# Patient Record
Sex: FEMALE | HISPANIC OR LATINO | Employment: FULL TIME | URBAN - METROPOLITAN AREA
[De-identification: names, ages, dates, MRNs, and addresses within clinical notes are randomized per-mention and may not be internally consistent; named-entity substitution may affect disease eponyms.]

---

## 2017-07-12 ENCOUNTER — OFFICE VISIT (OUTPATIENT)
Dept: RETAIL CLINIC | Facility: CLINIC | Age: 32
End: 2017-07-12

## 2017-07-12 VITALS
RESPIRATION RATE: 18 BRPM | HEART RATE: 74 BPM | SYSTOLIC BLOOD PRESSURE: 96 MMHG | OXYGEN SATURATION: 98 % | TEMPERATURE: 96.8 F | DIASTOLIC BLOOD PRESSURE: 60 MMHG

## 2017-07-12 DIAGNOSIS — H66.002 ACUTE SUPPURATIVE OTITIS MEDIA OF LEFT EAR WITHOUT SPONTANEOUS RUPTURE OF TYMPANIC MEMBRANE, RECURRENCE NOT SPECIFIED: Primary | ICD-10-CM

## 2017-07-12 PROCEDURE — 99203 OFFICE O/P NEW LOW 30 MIN: CPT | Performed by: NURSE PRACTITIONER

## 2017-07-12 RX ORDER — AMOXICILLIN 875 MG/1
875 TABLET, COATED ORAL 2 TIMES DAILY
Qty: 20 TABLET | Refills: 0 | Status: SHIPPED | OUTPATIENT
Start: 2017-07-12 | End: 2017-07-22

## 2017-07-12 NOTE — PATIENT INSTRUCTIONS
Otitis media - Adultos  (Otitis Media, Adult)  La otitis media es el enrojecimiento, el dolor y la inflamación del oído medio. La causa de la otitis media puede ser olivia alergia o, más frecuentemente, olivia infección. Muchas veces ocurre carter olivia complicación de un resfrío común.  SIGNOS Y SÍNTOMAS  Los síntomas de la otitis media son:  · Dolor de oídos.  · Fiebre.  · Zumbidos en el oído.  · Dolor de javon.  · Pérdida de líquido por el oído.  DIAGNÓSTICO  Para diagnosticar la otitis media, el médico examinará aparicio oído con un otoscopio. Brinda instrumento le permite al médico observar el interior del oído y examinar el tímpano. El médico le preguntará acerca de sheila síntomas.  TRATAMIENTO   Generalmente la otitis media mejora sin tratamiento entre 3 y los 5 días. El médico podrá recetar algunos medicamentos para calmar el dolor. Si la otitis media no mejora dentro de los 5 días o es recurrente, el médico puede prescribir antibióticos si sospecha que la causa es olivia infección bacteriana.  INSTRUCCIONES PARA EL CUIDADO EN EL HOGAR    · Si le recetaron antibióticos, asegúrese de terminarlos, incluso si comienza a sentirse mejor.  · [place] los medicamentos solamente carter se lo haya indicado el médico.  · Concurra a todas las visitas de control carter se lo haya indicado el médico.  SOLICITE ATENCIÓN MÉDICA SI:  · Tiene otitis media solo en un oído o sangra por la nariz, o ambas cosas.  · Advierte un bulto en el mónica.  · No mejora luego de 3 a 5 días.  · Empeora en lugar de mejorar.  SOLICITE ATENCIÓN MÉDICA DE INMEDIATO SI:   · Aumenta el dolor y no puede controlarlo con la medicación.  · Observa hinchazón, irritación o dolor alrededor del oído o rigidez en el mónica.  · Nota que olivia parte de aparicio murali está paralizada.  · Nota que el hueso que se encuentra detrás de la oreja (mastoides) le duele al tocarlo.  ASEGÚRESE DE QUE:   · Comprende estas instrucciones.  · Controlará aparicio afección.  · Recibirá ayuda de inmediato si no  mejora o si empeora.     Esta información no tiene carter fin reemplazar el consejo del médico. Asegúrese de hacerle al médico cualquier pregunta que tenga.     Document Released: 09/27/2006 Document Revised: 04/10/2017  [employer]vier Interactive Patient Education ©2017 [employer]

## 2017-07-12 NOTE — PROGRESS NOTES
Subjective   Patient ID: Stacy Banda is a 32 y.o. female presents with   Chief Complaint   Patient presents with   • Earache       Earache    There is pain in the left ear. This is a new problem. The current episode started yesterday. The problem occurs constantly. The problem has been gradually worsening. Maximum temperature: low grade. The pain is moderate. Pertinent negatives include no rhinorrhea or sore throat. She has tried ear drops for the symptoms. The treatment provided no relief. There is no history of a chronic ear infection, hearing loss or a tympanostomy tube.       No Known Allergies    The following portions of the patient's history were reviewed and updated as appropriate: allergies, current medications, past family history, past medical history, past social history, past surgical history and problem list.      Review of Systems   Constitutional: Positive for chills, diaphoresis, fatigue and fever.   HENT: Positive for ear pain. Negative for congestion, rhinorrhea, sinus pressure, sneezing and sore throat.    Respiratory: Negative.    Cardiovascular: Negative.    Gastrointestinal: Negative.        Objective     Vitals:    07/12/17 1440   BP: 96/60   Pulse: 74   Resp: 18   Temp: 96.8 °F (36 °C)   SpO2: 98%         Physical Exam   Constitutional: She is oriented to person, place, and time. She appears well-developed and well-nourished. She does not appear ill. No distress.   HENT:   Head: Normocephalic.   Right Ear: Hearing, tympanic membrane, external ear and ear canal normal.   Left Ear: Hearing, external ear and ear canal normal. Tympanic membrane is erythematous and bulging.   Nose: Nose normal. No rhinorrhea or sinus tenderness.   Mouth/Throat: Oropharynx is clear and moist and mucous membranes are normal. Tonsils are 2+ on the right. Tonsils are 2+ on the left. No tonsillar exudate.   Eyes: Conjunctivae are normal.   Sclera white.   Neck: No tracheal deviation present.   Cardiovascular: Normal  rate, regular rhythm, S1 normal, S2 normal and normal heart sounds.    Pulmonary/Chest: Effort normal and breath sounds normal. No accessory muscle usage. No respiratory distress.   Abdominal: Soft. Bowel sounds are normal. There is no tenderness.   Lymphadenopathy:     She has no cervical adenopathy.   Neurological: She is alert and oriented to person, place, and time.   Skin: Skin is warm and dry.   Vitals reviewed.        Stacy was seen today for earache.    Diagnoses and all orders for this visit:    Acute suppurative otitis media of left ear without spontaneous rupture of tympanic membrane, recurrence not specified    Other orders  -     amoxicillin (AMOXIL) 875 MG tablet; Take 1 tablet by mouth 2 (Two) Times a Day for 10 days.        Follow-up with Primary Care Physician in 48-72 hours if these symptoms worsen or fail to improve as anticipated. Patient verbalizes understanding.